# Patient Record
Sex: FEMALE | Race: WHITE | NOT HISPANIC OR LATINO | Employment: FULL TIME | URBAN - METROPOLITAN AREA
[De-identification: names, ages, dates, MRNs, and addresses within clinical notes are randomized per-mention and may not be internally consistent; named-entity substitution may affect disease eponyms.]

---

## 2017-01-03 ENCOUNTER — TRANSCRIBE ORDERS (OUTPATIENT)
Dept: ADMINISTRATIVE | Facility: HOSPITAL | Age: 42
End: 2017-01-03

## 2017-01-03 DIAGNOSIS — M54.6 THORACIC BACK PAIN, UNSPECIFIED BACK PAIN LATERALITY, UNSPECIFIED CHRONICITY: Primary | ICD-10-CM

## 2017-01-06 DIAGNOSIS — M54.2 NECK PAIN: ICD-10-CM

## 2017-01-09 RX ORDER — METAXALONE 800 MG/1
TABLET ORAL
Qty: 60 TABLET | Refills: 0 | Status: SHIPPED | OUTPATIENT
Start: 2017-01-09 | End: 2017-02-05

## 2017-01-12 ENCOUNTER — TREATMENT (OUTPATIENT)
Dept: PHYSICAL THERAPY | Facility: CLINIC | Age: 42
End: 2017-01-12

## 2017-01-12 DIAGNOSIS — S39.012D LUMBAR STRAIN, SUBSEQUENT ENCOUNTER: ICD-10-CM

## 2017-01-12 DIAGNOSIS — M54.2 PAIN, NECK: Primary | ICD-10-CM

## 2017-01-12 PROCEDURE — 97530 THERAPEUTIC ACTIVITIES: CPT | Performed by: PHYSICAL THERAPIST

## 2017-01-12 PROCEDURE — 97110 THERAPEUTIC EXERCISES: CPT | Performed by: PHYSICAL THERAPIST

## 2017-01-12 PROCEDURE — 97140 MANUAL THERAPY 1/> REGIONS: CPT | Performed by: PHYSICAL THERAPIST

## 2017-01-12 PROCEDURE — 97164 PT RE-EVAL EST PLAN CARE: CPT | Performed by: PHYSICAL THERAPIST

## 2017-01-12 NOTE — PROGRESS NOTES
RE-Evaluation     Subjective    Patient reports having a MRI at [facility] showing bulging disc and arthritis;  [facility] Pain MD - Has received 2 injections (1st one increased pain, but second one went better with mm relaxer and pain meds yesterday);  Pt reports decreased pain and improved mobility overall, but still limited and painful - Prior to injections, pain was 10/10 with severely limited mobility and function - Now pain level 6-7/10 and limited cervical rotation to (L) >(R) - Denies UE NT-ing since the first injection;   Pain (B) neck and UT (R-side mm are usually tighter) -   LBP central mostly -          Objective     Guarded posture - Pt making repeated neck movements side/side - Loss of cervical lordosis -     PALPATION: Tender (B) CPS, Facets and UT w/ multiple trigger points; 1st ribs (B); Scalenes (B); SCM (B)    AROM: C-Flexion 25-deg; Ext 35-deg; SB 40-deg; ROT 30-deg (R); 40-deg (L);  Lumbar spine; Flex WFL, but pain central lumbar spine; Ext 50% w/ pain; Pain / /limited SB grossly (B);     STRENGTH: UE/LE myotomes grossly WFL    SPECIAL TESTS: Cervical Compression (decreased pain); Cervical Distraction (decreased pain); (+) Elevated 1st rib (B); (+) SI Jt dysfunction; Flexed sacrum;     PROCEDURES AND MODALITIES:     FUNCTIONAL ACTIVITIES:  X 20min  ·   TAPING / BRACING:  · Reviewed HEP and gym activities and progression  · Fitted patient with TENS unit   ·    Jt protection, ADL modification; Posture and      EXERCISE  Exercise 1  Exercise Name 1: UT stretching (B)  Sets/Reps 1: 5/30 sec  Treatment Type 1: Therapeutic Exercise  Exercise 2  Exercise Name 2: Chin Tucks  Sets/Reps 2: 15 / 5 sec ea  Treatment Type 2: Therapeutic Exercise Exercise 3  Exercise Name 3: Scap Retraction  Sets/Reps 3: 20/10 sec ea  Treatment Type 3: Therapeutic Exercise                                                MANUAL PT:  Manual Rx 1 Location: STM / DTM / TPR  Manual Rx 1 Type: (B) UT/ CPS,  Med scap   Manual Rx 1 Duration: 15 min    Manual Rx 2 Location: 1st Rib mobilizaiton (R) / (L)  Manual Rx 2 Duration: 10 min     Manual PT [id number] 25 minutes, Therapy Exercise [id number] 15 minutes and Ther Act 56504 20 minutes; RE-EVAL x 20 minutes    Assessment/Plan     Cervical strain; Elevated first rib; LBP; SI Jt dysfunction - Since over-doing it at gym this fall -   Goals: Continue to progress towards goals set at initial evaluation - -     Continue PT 2-3x/wk x 4-6 weeks with modalities prn, manual therapy, taping prn, therapeutic exercise / therapeutic activities, NMR, jt mobilization, Pt ed, HEP, etc    Hiram Mercedes, PT  Physical Therapist  ________________________________________________________    I certify that the therapy services are furnished while this patient is under my care. The services outlined above are required by this patient, and will be reviewed every 90 days.      PHYSICIAN SIGNATURE: __________________________ DATE: ______   Jack Malone Jr., DO         Please sign and return via fax to [phone]. Thank you, [facility].

## 2017-01-12 NOTE — LETTER
[facility]  Physical Therapy  _____________________________________________________  RE-Evaluation   To: Jack Malone Jr., DO  From: Hiram Mercedes PT  Re: Nathalie Weber  Date: 01/12/2017    Subjective    Patient reports having a MRI at [facility] showing bulging disc and arthritis;  [facility] Pain MD - Has received 2 injections (1st one increased pain, but second one went better with mm relaxer and pain meds yesterday);  Pt reports decreased pain and improved mobility overall, but still limited and painful - Prior to injections, pain was 10/10 with severely limited mobility and function - Now pain level 6-7/10 and limited cervical rotation to (L) >(R) - Denies UE NT-ing since the first injection;   Pain (B) neck and UT (R-side mm are usually tighter) -   LBP central mostly -          Objective     Guarded posture - Pt making repeated neck movements side/side - Loss of cervical lordosis -   PALPATION: Tender (B) CPS, Facets and UT w/ multiple trigger points; 1st ribs (B); Scalenes (B); SCM (B)  AROM: C-Flexion 25-deg; Ext 35-deg; SB 40-deg; ROT 30-deg (R); 40-deg (L);  Lumbar spine; Flex WFL, but pain central lumbar spine; Ext 50% w/ pain; Pain / /limited SB grossly (B);   STRENGTH: UE/LE myotomes grossly WFL  SPECIAL TESTS: Cervical Compression (decreased pain); Cervical Distraction (decreased pain); (+) Elevated 1st rib (B); (+) SI Jt dysfunction; Flexed sacrum;     Assessment/Plan     Cervical strain; Elevated first rib; LBP; SI Jt dysfunction - Since over-doing it at gym this fall -   Goals: Continue to progress towards goals set at initial evaluation - -     Resume / Continue PT 2-3x/wk x 4-6 weeks with modalities prn, manual therapy, taping prn, therapeutic exercise / therapeutic activities, NMR, jt mobilization, Pt ed, HEP, etc    Hiram Mercedes, PT  Physical Therapist  ________________________________________________________    I certify that the therapy services are furnished while  this patient is under my care. The services outlined above are required by this patient, and will be reviewed every 90 days.      PHYSICIAN SIGNATURE: __________________________ DATE: ______   Jack Malone Jr., DO         Please sign and return via fax to [phone]. Thank you, [facility].    _____________________________________________________________________  [address] [place] [address]  Phone: [phone]     Fax: [phone]

## 2017-01-18 ENCOUNTER — TREATMENT (OUTPATIENT)
Dept: PHYSICAL THERAPY | Facility: CLINIC | Age: 42
End: 2017-01-18

## 2017-01-18 DIAGNOSIS — S39.012D LUMBAR STRAIN, SUBSEQUENT ENCOUNTER: ICD-10-CM

## 2017-01-18 DIAGNOSIS — M54.2 PAIN, NECK: Primary | ICD-10-CM

## 2017-01-18 PROCEDURE — 97035 APP MDLTY 1+ULTRASOUND EA 15: CPT | Performed by: PHYSICAL THERAPIST

## 2017-01-18 PROCEDURE — 97530 THERAPEUTIC ACTIVITIES: CPT | Performed by: PHYSICAL THERAPIST

## 2017-01-18 PROCEDURE — 97140 MANUAL THERAPY 1/> REGIONS: CPT | Performed by: PHYSICAL THERAPIST

## 2017-01-23 NOTE — PROGRESS NOTES
Daily Progress Note  Subjective    Patient reports significantly increased pain in (B) UT/Neck after last session and since - Pt reports only taking OTC anti-inflammatories for the pain which are not helpful -          Objective     Pt in obvious discomfort with guarded posture and cervical AROM -     PALPATION: Severely tender and tight (B) UT/ CPS with multiple trigger points and hypersensitivity -     AROM: C-AROM WFL    STRENGTH:     SPECIAL TESTS: Elevated 1st rib (B)    PROCEDURES AND MODALITIES:     Ultrasound 00679  Location: (B) UT / lower c-spine  Rx Minutes: 6 min each - 12 min tl  Duty Cycle: 100  Frequency: 1.0 MHz  Intensity - Wts/cm: 2       FUNCTIONAL ACTIVITIES:  X 15 min  ·   TAPING / BRACING: K-Tape to Unload (B) UT ; (B) CPS;  - TA  ·    Jt protection, ADL modification; Posture and      EXERCISE  Exercise 1  Exercise Name 1: UT stretching (B)  Sets/Reps 1: 5/30 sec  Treatment Type 1: Therapeutic Exercise                                                        MANUAL PT:  Manual Rx 1 Location: STM / MFR   Manual Rx 1 Type: (B) UT, CPS  Manual Rx 1 Duration: 20    Manual Rx 6 Location: CERVICAL Distraction / mobilization  Manual Rx 6 Duration: 10 min           Manual PT [id number] 30 minutes, Therapy Exercise 95889 05 minutes, Ther Act 83649 15 minutes and Other Procedure CPT Ultrasound minutes 12    Timed Code Treatment: 62 Minutes and Total Treatment Time: 70 Minutes    Assessment/Plan     Cervical strain; Elevated first rib; myofascial syndrome;LBP; SI Jt dysfunction - Since over-doing it at gym this fall -   Severely increased pain after last session - Unusual as this same treatment had been effective in the past -   Pt may benefit from further assessment due to persistent and severe pain -  Pt may not benefit from further Physical Therapy due to severe reaction -     Progress strengthening /stabilization /functional activity vs hold PT until further assessment -          Hiram Mercedes,  PT  Physical Therapist

## 2017-01-27 ENCOUNTER — HOSPITAL ENCOUNTER (OUTPATIENT)
Dept: MRI IMAGING | Facility: HOSPITAL | Age: 42
Discharge: HOME | End: 2017-01-27
Admitting: PAIN MEDICINE

## 2017-01-27 DIAGNOSIS — M54.6 THORACIC BACK PAIN, UNSPECIFIED BACK PAIN LATERALITY, UNSPECIFIED CHRONICITY: ICD-10-CM

## 2017-01-27 PROCEDURE — 72146 MRI CHEST SPINE W/O DYE: CPT

## 2017-02-02 ENCOUNTER — TRANSCRIBE ORDERS (OUTPATIENT)
Dept: ADMINISTRATIVE | Facility: HOSPITAL | Age: 42
End: 2017-02-02

## 2017-02-02 DIAGNOSIS — R93.7 ABNORMAL MRI, THORACIC SPINE: Primary | ICD-10-CM

## 2017-02-05 RX ORDER — BACLOFEN 10 MG/1
10 TABLET ORAL 2 TIMES DAILY
COMMUNITY
End: 2017-06-27

## 2017-02-05 RX ORDER — HYDROCODONE BITARTRATE AND ACETAMINOPHEN 5; 325 MG/1; MG/1
1 TABLET ORAL EVERY 8 HOURS PRN
COMMUNITY
End: 2017-06-27

## 2017-02-09 ENCOUNTER — HOSPITAL ENCOUNTER (OUTPATIENT)
Dept: GENERAL RADIOLOGY | Facility: HOSPITAL | Age: 42
Discharge: HOME | End: 2017-02-09

## 2017-02-09 ENCOUNTER — HOSPITAL ENCOUNTER (OUTPATIENT)
Dept: CT IMAGING | Facility: HOSPITAL | Age: 42
Discharge: HOME | End: 2017-02-09

## 2017-02-09 ENCOUNTER — TRANSCRIBE ORDERS (OUTPATIENT)
Dept: ADMINISTRATIVE | Facility: HOSPITAL | Age: 42
End: 2017-02-09

## 2017-02-09 VITALS
HEIGHT: 62 IN | WEIGHT: 102 LBS | SYSTOLIC BLOOD PRESSURE: 145 MMHG | BODY MASS INDEX: 18.77 KG/M2 | DIASTOLIC BLOOD PRESSURE: 99 MMHG | RESPIRATION RATE: 16 BRPM | TEMPERATURE: 97.3 F | HEART RATE: 50 BPM | OXYGEN SATURATION: 100 %

## 2017-02-09 DIAGNOSIS — R93.7 ABNORMAL MRI, THORACIC SPINE: ICD-10-CM

## 2017-02-09 DIAGNOSIS — R93.89 ABNORMAL MRI: Primary | ICD-10-CM

## 2017-02-09 PROCEDURE — 72255 MYELOGRAPHY THORACIC SPINE: CPT

## 2017-02-09 PROCEDURE — 72128 CT CHEST SPINE W/O DYE: CPT

## 2017-02-09 PROCEDURE — 25010000002 CHLOROPROCAINE PER 30 ML: Performed by: RADIOLOGY

## 2017-02-09 PROCEDURE — 0 IOPAMIDOL 61 % SOLUTION: Performed by: RADIOLOGY

## 2017-02-09 RX ORDER — IBUPROFEN 800 MG/1
800 TABLET ORAL AS NEEDED
COMMUNITY
Start: 2016-10-21 | End: 2017-02-12

## 2017-02-09 RX ORDER — ALPRAZOLAM 1 MG/1
1 TABLET ORAL ONCE
Status: COMPLETED | OUTPATIENT
Start: 2017-02-09 | End: 2017-02-09

## 2017-02-09 RX ADMIN — CHLOROPROCAINE HYDROCHLORIDE 4 ML: 20 INJECTION, SOLUTION INFILTRATION; PERINEURAL at 09:13

## 2017-02-09 RX ADMIN — ALPRAZOLAM 1 MG: 1 TABLET ORAL at 08:16

## 2017-02-09 RX ADMIN — ALPRAZOLAM 1 MG: 1 TABLET ORAL at 08:35

## 2017-02-09 RX ADMIN — IOPAMIDOL 11 ML: 612 INJECTION, SOLUTION INTRATHECAL at 09:14

## 2017-02-09 NOTE — DISCHARGE INSTRUCTIONS
Verbal and written D/C instructions given to patient and family member, father.  She voices understanding and is able to teach back D/C instructions.  Scheduled procedure for 2/17/17 Friday was given to patient in writing and she was instructed to bring the provided order with her on that date. She voiced understanding.

## 2017-02-09 NOTE — NURSING NOTE
Patient states she is not feeling any thing from the Xanax 1 mg given earlier.  Dr. Tenorio ordered an additional Xanax 1 mg.  Patient awake and alert, talking

## 2017-02-09 NOTE — NURSING NOTE
Patient called to come back in for a delayed CT scan to see if images needed can be seen.  Dr. Newton will talk with patient after.  Patient is on her way now.

## 2017-02-10 ENCOUNTER — HOSPITAL ENCOUNTER (EMERGENCY)
Facility: HOSPITAL | Age: 42
Discharge: HOME | End: 2017-02-11
Attending: EMERGENCY MEDICINE | Admitting: EMERGENCY MEDICINE

## 2017-02-10 DIAGNOSIS — G97.1 POST-LUMBAR PUNCTURE HEADACHE: Primary | ICD-10-CM

## 2017-02-10 LAB
ANION GAP SERPL CALCULATED.3IONS-SCNC: 13.6 MMOL/L
APTT PPP: 31.7 SECONDS (ref 22.7–35.4)
BASOPHILS # BLD AUTO: 0.12 10*3/MM3 (ref 0–0.2)
BASOPHILS NFR BLD AUTO: 1.2 % (ref 0–1.5)
BUN BLD-MCNC: 10 MG/DL (ref 6–20)
BUN/CREAT SERPL: 11.2 (ref 7–25)
CALCIUM SPEC-SCNC: 9.3 MG/DL (ref 8.6–10.5)
CHLORIDE SERPL-SCNC: 109 MMOL/L (ref 98–107)
CO2 SERPL-SCNC: 21.4 MMOL/L (ref 22–29)
CREAT BLD-MCNC: 0.89 MG/DL (ref 0.57–1)
DEPRECATED RDW RBC AUTO: 50.6 FL (ref 37–54)
EOSINOPHIL # BLD AUTO: 1.21 10*3/MM3 (ref 0–0.7)
EOSINOPHIL NFR BLD AUTO: 12 % (ref 0.3–6.2)
ERYTHROCYTE [DISTWIDTH] IN BLOOD BY AUTOMATED COUNT: 12.9 % (ref 11.7–13)
GFR SERPL CREATININE-BSD FRML MDRD: 70 ML/MIN/1.73
GLUCOSE BLD-MCNC: 100 MG/DL (ref 65–99)
HCT VFR BLD AUTO: 40.2 % (ref 35.6–45.5)
HGB BLD-MCNC: 13.9 G/DL (ref 11.9–15.5)
IMM GRANULOCYTES # BLD: 0.02 10*3/MM3 (ref 0–0.03)
IMM GRANULOCYTES NFR BLD: 0.2 % (ref 0–0.5)
INR PPP: 1.06 (ref 0.9–1.1)
LYMPHOCYTES # BLD AUTO: 3.69 10*3/MM3 (ref 0.9–4.8)
LYMPHOCYTES NFR BLD AUTO: 36.6 % (ref 19.6–45.3)
MACROCYTES BLD QL SMEAR: NORMAL
MCH RBC QN AUTO: 37.4 PG (ref 26.9–32)
MCHC RBC AUTO-ENTMCNC: 34.6 G/DL (ref 32.4–36.3)
MCV RBC AUTO: 108.1 FL (ref 80.5–98.2)
MONOCYTES # BLD AUTO: 0.74 10*3/MM3 (ref 0.2–1.2)
MONOCYTES NFR BLD AUTO: 7.3 % (ref 5–12)
NEUTROPHILS # BLD AUTO: 4.29 10*3/MM3 (ref 1.9–8.1)
NEUTROPHILS NFR BLD AUTO: 42.7 % (ref 42.7–76)
PLAT MORPH BLD: NORMAL
PLATELET # BLD AUTO: 242 10*3/MM3 (ref 140–500)
PMV BLD AUTO: 11.1 FL (ref 6–12)
POTASSIUM BLD-SCNC: 3.9 MMOL/L (ref 3.5–5.2)
PROTHROMBIN TIME: 13.4 SECONDS (ref 11.7–14.2)
RBC # BLD AUTO: 3.72 10*6/MM3 (ref 3.9–5.2)
SODIUM BLD-SCNC: 144 MMOL/L (ref 136–145)
WBC MORPH BLD: NORMAL
WBC NRBC COR # BLD: 10.07 10*3/MM3 (ref 4.5–10.7)

## 2017-02-10 PROCEDURE — 85730 THROMBOPLASTIN TIME PARTIAL: CPT | Performed by: PHYSICIAN ASSISTANT

## 2017-02-10 PROCEDURE — 85007 BL SMEAR W/DIFF WBC COUNT: CPT | Performed by: PHYSICIAN ASSISTANT

## 2017-02-10 PROCEDURE — 99283 EMERGENCY DEPT VISIT LOW MDM: CPT

## 2017-02-10 PROCEDURE — 96375 TX/PRO/DX INJ NEW DRUG ADDON: CPT

## 2017-02-10 PROCEDURE — 85610 PROTHROMBIN TIME: CPT | Performed by: PHYSICIAN ASSISTANT

## 2017-02-10 PROCEDURE — 25010000002 HYDROMORPHONE PER 4 MG: Performed by: EMERGENCY MEDICINE

## 2017-02-10 PROCEDURE — 96376 TX/PRO/DX INJ SAME DRUG ADON: CPT

## 2017-02-10 PROCEDURE — 25010000002 ONDANSETRON PER 1 MG: Performed by: PHYSICIAN ASSISTANT

## 2017-02-10 PROCEDURE — 96361 HYDRATE IV INFUSION ADD-ON: CPT

## 2017-02-10 PROCEDURE — 96374 THER/PROPH/DIAG INJ IV PUSH: CPT

## 2017-02-10 PROCEDURE — 85025 COMPLETE CBC W/AUTO DIFF WBC: CPT | Performed by: PHYSICIAN ASSISTANT

## 2017-02-10 PROCEDURE — 25010000002 HYDROMORPHONE PER 4 MG: Performed by: PHYSICIAN ASSISTANT

## 2017-02-10 PROCEDURE — 80048 BASIC METABOLIC PNL TOTAL CA: CPT | Performed by: PHYSICIAN ASSISTANT

## 2017-02-10 RX ORDER — ONDANSETRON 2 MG/ML
4 INJECTION INTRAMUSCULAR; INTRAVENOUS ONCE
Status: COMPLETED | OUTPATIENT
Start: 2017-02-10 | End: 2017-02-10

## 2017-02-10 RX ORDER — SODIUM CHLORIDE 0.9 % (FLUSH) 0.9 %
10 SYRINGE (ML) INJECTION AS NEEDED
Status: DISCONTINUED | OUTPATIENT
Start: 2017-02-10 | End: 2017-02-11 | Stop reason: HOSPADM

## 2017-02-10 RX ADMIN — ONDANSETRON 4 MG: 2 INJECTION INTRAMUSCULAR; INTRAVENOUS at 22:43

## 2017-02-10 RX ADMIN — HYDROMORPHONE HYDROCHLORIDE 1 MG: 1 INJECTION, SOLUTION INTRAMUSCULAR; INTRAVENOUS; SUBCUTANEOUS at 23:59

## 2017-02-10 RX ADMIN — SODIUM CHLORIDE 1000 ML: 9 INJECTION, SOLUTION INTRAVENOUS at 22:39

## 2017-02-10 RX ADMIN — HYDROMORPHONE HYDROCHLORIDE 1 MG: 1 INJECTION, SOLUTION INTRAMUSCULAR; INTRAVENOUS; SUBCUTANEOUS at 22:44

## 2017-02-10 NOTE — ED NOTES
Pt had a mylogram yesterday. They called radiology triage and radiology triage told her that the ER staff needed to get her out of her car by stretcher. Pt's family states that the radiology triage nurse  told them that we could not put her into a wheelchair, that we had to place her into a stretcher from the car. Pt states that the radiology triage nurse told them to come into the ER to get a blood patch.     Loyd Hawkins  02/10/17 1854       Loyd Hawkins  02/10/17 1857       Loyd Hawkins  02/10/17 1901

## 2017-02-11 ENCOUNTER — HOSPITAL ENCOUNTER (EMERGENCY)
Dept: PREOP | Facility: HOSPITAL | Age: 42
Discharge: HOME | End: 2017-02-11

## 2017-02-11 ENCOUNTER — ANESTHESIA (OUTPATIENT)
Dept: PREOP | Facility: HOSPITAL | Age: 42
End: 2017-02-11

## 2017-02-11 ENCOUNTER — ANESTHESIA EVENT (OUTPATIENT)
Dept: PREOP | Facility: HOSPITAL | Age: 42
End: 2017-02-11

## 2017-02-11 ENCOUNTER — APPOINTMENT (OUTPATIENT)
Dept: PREOP | Facility: HOSPITAL | Age: 42
End: 2017-02-11

## 2017-02-11 VITALS
HEIGHT: 62 IN | TEMPERATURE: 97.7 F | OXYGEN SATURATION: 98 % | BODY MASS INDEX: 19.14 KG/M2 | WEIGHT: 104 LBS | RESPIRATION RATE: 16 BRPM | HEART RATE: 69 BPM | SYSTOLIC BLOOD PRESSURE: 127 MMHG | DIASTOLIC BLOOD PRESSURE: 100 MMHG

## 2017-02-11 VITALS
DIASTOLIC BLOOD PRESSURE: 59 MMHG | HEART RATE: 68 BPM | OXYGEN SATURATION: 97 % | TEMPERATURE: 97.8 F | RESPIRATION RATE: 12 BRPM | SYSTOLIC BLOOD PRESSURE: 91 MMHG

## 2017-02-11 LAB
HOLD SPECIMEN: NORMAL
HOLD SPECIMEN: NORMAL
WHOLE BLOOD HOLD SPECIMEN: NORMAL
WHOLE BLOOD HOLD SPECIMEN: NORMAL

## 2017-02-11 PROCEDURE — 25010000002 MIDAZOLAM PER 1 MG: Performed by: ANESTHESIOLOGY

## 2017-02-11 PROCEDURE — 25010000002 FENTANYL CITRATE (PF) 100 MCG/2ML SOLUTION: Performed by: ANESTHESIOLOGY

## 2017-02-11 RX ORDER — HYDROCODONE BITARTRATE AND ACETAMINOPHEN 10; 325 MG/1; MG/1
1 TABLET ORAL EVERY 6 HOURS PRN
Qty: 5 TABLET | Refills: 0 | Status: SHIPPED | OUTPATIENT
Start: 2017-02-11 | End: 2017-06-27

## 2017-02-11 RX ORDER — SODIUM CHLORIDE, SODIUM LACTATE, POTASSIUM CHLORIDE, CALCIUM CHLORIDE 600; 310; 30; 20 MG/100ML; MG/100ML; MG/100ML; MG/100ML
9 INJECTION, SOLUTION INTRAVENOUS CONTINUOUS
Status: DISCONTINUED | OUTPATIENT
Start: 2017-02-11 | End: 2017-02-12 | Stop reason: HOSPADM

## 2017-02-11 RX ORDER — FENTANYL CITRATE 50 UG/ML
50 INJECTION, SOLUTION INTRAMUSCULAR; INTRAVENOUS
Status: DISCONTINUED | OUTPATIENT
Start: 2017-02-11 | End: 2017-02-12 | Stop reason: HOSPADM

## 2017-02-11 RX ORDER — SODIUM CHLORIDE 0.9 % (FLUSH) 0.9 %
1-10 SYRINGE (ML) INJECTION AS NEEDED
Status: DISCONTINUED | OUTPATIENT
Start: 2017-02-11 | End: 2017-02-12 | Stop reason: HOSPADM

## 2017-02-11 RX ORDER — MIDAZOLAM HYDROCHLORIDE 1 MG/ML
1 INJECTION INTRAMUSCULAR; INTRAVENOUS
Status: DISCONTINUED | OUTPATIENT
Start: 2017-02-11 | End: 2017-02-12 | Stop reason: HOSPADM

## 2017-02-11 RX ORDER — MIDAZOLAM HYDROCHLORIDE 1 MG/ML
2 INJECTION INTRAMUSCULAR; INTRAVENOUS
Status: DISCONTINUED | OUTPATIENT
Start: 2017-02-11 | End: 2017-02-12 | Stop reason: HOSPADM

## 2017-02-11 RX ADMIN — MIDAZOLAM HYDROCHLORIDE 1 MG: 1 INJECTION, SOLUTION INTRAMUSCULAR; INTRAVENOUS at 09:38

## 2017-02-11 RX ADMIN — FENTANYL CITRATE 50 MCG: 50 INJECTION INTRAMUSCULAR; INTRAVENOUS at 09:38

## 2017-02-11 RX ADMIN — SODIUM CHLORIDE, POTASSIUM CHLORIDE, SODIUM LACTATE AND CALCIUM CHLORIDE 9 ML/HR: 600; 310; 30; 20 INJECTION, SOLUTION INTRAVENOUS at 09:38

## 2017-02-11 NOTE — NURSING NOTE
VSS PT IN HOLDING FOR BLOOD PATCH.  STATES H/A PAIN IS A 10  MEDS GIVEN PER NURSE.  EPIDURAL PER ANESTHESIA.  PT TOLERATED PROCEDURE WELL.

## 2017-02-11 NOTE — ED NOTES
Pt in room with , crying stating her head is no better.  Charli into see      Cristhian Christopher RN  02/10/17 4281

## 2017-02-11 NOTE — ED NOTES
Pt calm at discharge.  Voiced understanding regarding coming back in the am for blood patch.       Cristhian Christopher RN  02/11/17 0102

## 2017-02-11 NOTE — ED PROVIDER NOTES
Pt presents with post LP headache after myelogram yesterday. HA is positional and she seems very uncomfortable. Spoke with anesthesia twice tonight and they say they are very short staffed and unable to perform blood patch tonight. Arranged for her to receive blood patch tomorrow at 0830. Offered admission which she declines.             I supervised care provided by the midlevel provider.   We have discussed this patient's history, physical exam, and treatment plan.  I have reviewed the note and personally saw and examined the patient and agree with the plan of care. See attending note.  Documentation assistance provided by mainor Mcclain for Dr. Olson.  Information recorded by the scribe was done at my direction and has been verified and validated by me.       Do Mcclain  02/11/17 0036       Chepe Olson MD  02/11/17 0154

## 2017-02-11 NOTE — ED PROVIDER NOTES
" EMERGENCY DEPARTMENT ENCOUNTER    CHIEF COMPLAINT  Chief Complaint: HA secondary to a myelogram  History given by: Patient  History limited by: Nothing  Room Number: 02/02  PMD: Henry Witt MD      HPI:  Pt is a 41 y.o. female who presents complaining of HA secondary to a myelogram yesterday. The pt was getting a myelogram for further evaluation into a cyst on her spine. Pt reports nausea, chills, and vomiting. Pt denies fever, numbness, and tingling.    Duration: Since the myelogram yesterday  Onset: Gradual  Timing: Constant  Location: Head  Radiation: None  Quality: \"Pain\"  Intensity/Severity: Moderate  Progression: Unchanged  Associated Symptoms: Nausea, chills, and vomiting  Aggravating Factors: Position, upright  Alleviating Factors: Nothing  Previous Episodes: None  Treatment before arrival: Nothing    PAST MEDICAL HISTORY  Active Ambulatory Problems     Diagnosis Date Noted   • Macrocytosis without anemia 05/16/2016     Resolved Ambulatory Problems     Diagnosis Date Noted   • No Resolved Ambulatory Problems     Past Medical History   Diagnosis Date   • Anxiety    • Depression    • Hypertension        PAST SURGICAL HISTORY  Past Surgical History   Procedure Laterality Date   • Cervical conization, leep     • Cervical conization, leep     • Epidural block injection       cervical and lumbar       FAMILY HISTORY  Family History   Problem Relation Age of Onset   • Cancer Mother    • Hypertension Father        SOCIAL HISTORY  Social History     Social History   • Marital status: Unknown     Spouse name: N/A   • Number of children: N/A   • Years of education: N/A     Occupational History   • Not on file.     Social History Main Topics   • Smoking status: Former Smoker     Types: Cigarettes     Quit date: 3/1/2016   • Smokeless tobacco: Not on file   • Alcohol use No   • Drug use: Not on file   • Sexual activity: Not on file     Other Topics Concern   • Not on file     Social History Narrative   • No " narrative on file       ALLERGIES  Lidocaine; Novocain [procaine]; and Percocet [oxycodone-acetaminophen]    REVIEW OF SYSTEMS  Review of Systems   Constitutional: Positive for chills. Negative for fever.   HENT: Negative for sore throat and trouble swallowing.    Eyes: Negative for visual disturbance.   Respiratory: Negative for cough and shortness of breath.    Cardiovascular: Negative for chest pain, palpitations and leg swelling.   Gastrointestinal: Positive for nausea and vomiting. Negative for abdominal pain and diarrhea.   Endocrine: Negative.    Genitourinary: Negative for decreased urine volume, dysuria and frequency.   Musculoskeletal: Negative for neck pain.   Skin: Negative for rash.   Allergic/Immunologic: Negative.    Neurological: Positive for headaches. Negative for syncope, weakness and numbness.   Hematological: Negative.    Psychiatric/Behavioral: Negative.    All other systems reviewed and are negative.      PHYSICAL EXAM  ED Triage Vitals   Temp Heart Rate Resp BP SpO2   02/10/17 1852 02/10/17 1852 02/10/17 1852 02/10/17 1852 02/10/17 1852   96.5 °F (35.8 °C) 65 20 113/82 100 %      Temp src Heart Rate Source Patient Position BP Location FiO2 (%)   02/10/17 1852 02/10/17 1852 02/10/17 1852 02/10/17 1852 --   Tympanic Monitor Lying Right arm        Physical Exam   Constitutional: She is oriented to person, place, and time and well-developed, well-nourished, and in no distress. No distress.   HENT:   Head: Normocephalic and atraumatic.   Eyes: EOM are normal. Pupils are equal, round, and reactive to light.   Neck: Normal range of motion. Neck supple.   The pt has no meningeal signs.   Cardiovascular: Normal rate, regular rhythm and normal heart sounds.    Pulmonary/Chest: Effort normal and breath sounds normal. No respiratory distress.   Abdominal: Soft. There is no tenderness. There is no rebound and no guarding.   Musculoskeletal: Normal range of motion. She exhibits no edema.   Neurological:  She is alert and oriented to person, place, and time. She has normal sensation and normal strength.   The pt has no focal neurological deficits.   Skin: Skin is warm and dry. No rash noted.   Psychiatric: Mood and affect normal.   Nursing note and vitals reviewed.      LAB RESULTS  Lab Results (last 24 hours)     Procedure Component Value Units Date/Time    CBC & Differential [[id number]] Collected:  02/10/17 2240    Specimen:  Blood Updated:  02/10/17 2318    Narrative:       The following orders were created for panel order CBC & Differential.  Procedure                               Abnormality         Status                     ---------                               -----------         ------                     Scan Slide[[id number]]                                        Final result               CBC Auto Differential[[id number]]         Abnormal            Final result                 Please view results for these tests on the individual orders.    Basic Metabolic Panel [[id number]]  (Abnormal) Collected:  02/10/17 2240    Specimen:  Blood from Arm, Left Updated:  02/10/17 2317     Glucose 100 (H) mg/dL      BUN 10 mg/dL      Creatinine 0.89 mg/dL      Sodium 144 mmol/L      Potassium 3.9 mmol/L      Chloride 109 (H) mmol/L      CO2 21.4 (L) mmol/L      Calcium 9.3 mg/dL      eGFR Non African Amer 70 mL/min/1.73      BUN/Creatinine Ratio 11.2      Anion Gap 13.6 mmol/L     Narrative:       GFR Normal >60  Chronic Kidney Disease <60  Kidney Failure <15    Protime-INR [[id number]]  (Normal) Collected:  02/10/17 2240    Specimen:  Blood from Arm, Left Updated:  02/10/17 2311     Protime 13.4 Seconds      INR 1.06     aPTT [[id number]]  (Normal) Collected:  02/10/17 2240    Specimen:  Blood from Arm, Left Updated:  02/10/17 2311     PTT 31.7 seconds     CBC Auto Differential [[id number]]  (Abnormal) Collected:  02/10/17 2240    Specimen:  Blood from Arm, Left Updated:  02/10/17 2318     WBC 10.07 10*3/mm3      RBC 3.72 (L)  10*6/mm3      Hemoglobin 13.9 g/dL      Hematocrit 40.2 %      .1 (H) fL      MCH 37.4 (H) pg      MCHC 34.6 g/dL      RDW 12.9 %      RDW-SD 50.6 fl      MPV 11.1 fL      Platelets 242 10*3/mm3      Neutrophil % 42.7 %      Lymphocyte % 36.6 %      Monocyte % 7.3 %      Eosinophil % 12.0 (H) %      Basophil % 1.2 %      Immature Grans % 0.2 %      Neutrophils, Absolute 4.29 10*3/mm3      Lymphocytes, Absolute 3.69 10*3/mm3      Monocytes, Absolute 0.74 10*3/mm3      Eosinophils, Absolute 1.21 (H) 10*3/mm3      Basophils, Absolute 0.12 10*3/mm3      Immature Grans, Absolute 0.02 10*3/mm3     Scan Slide [[id number]] Collected:  02/10/17 2240    Specimen:  Blood from Arm, Left Updated:  02/10/17 2318     Macrocytes Large/3+      WBC Morphology Normal      Platelet Morphology Normal           I ordered the above labs and reviewed the results    RADIOLOGY  No orders to display        I ordered the above noted radiological studies. Interpreted by radiologist. Reviewed by me in PACS.       PROCEDURES  Procedures      PROGRESS AND CONSULTS  ED Course     2239  IVFs, Dilaudid, and Zofran ordered.    2247  Labs ordered for further evaluation.    2250  Received a call from Anesthesiology and discussed pt's case. Anesthesiology states they will be unable to perform the blood patch at this time.    2343  BP- 127/100 HR- 69 Temp- 97.7 °F (36.5 °C) O2 sat- 98%    Rechecked pt, she is currently in pain even after receiving the dose of Dilaudid. The pt states the Dilaudid helped initially, but it is not helping the pain at this time. Discussed the plan to order another dose of Dilaudid to help with her pain. The pt understands and agrees with the plan.    2353  Dilaudid ordered.    0011  BP- 127/100 HR- 69 Temp- 97.7 °F (36.5 °C) O2 sat- 98%    Rechecked pt, her pain has improved after receiving the Dilaudid, but she is still having pain. I informed the pt that we called anesthesiology, but they are not able to perform the  blood patch. Discussed the plan to admit the pt, but the pt states she would rather be discharged home because she would feel more comfortable there. The pt states she would f/u tomorrow to get the procedure done. Discussed the plan to consult with anesthesiology to try and schedule a blood patch procedure. The pt understands and agrees with the plan.    0030  After talking with anesthesiology, the pt has a f/u appointment tomorrow at 0830 for a blood patch. Ginger discussed the appointment with the pt.    MEDICAL DECISION MAKING  Results were reviewed/discussed with the patient and they were also made aware of online access. Pt also made aware that some labs, such as cultures, will not be resulted during ER visit and follow up with PMD is necessary.     MDM  Number of Diagnoses or Management Options     Amount and/or Complexity of Data Reviewed  Clinical lab tests: ordered and reviewed  Discussion of test results with the performing providers: yes (Anesthesiology)  Review and summarize past medical records: yes (The pt had an LP done yesterday.)  Discuss the patient with other providers: yes    Patient Progress  Patient progress: stable         DIAGNOSIS  Final diagnoses:   Post-lumbar puncture headache     DISPOSITION    DISCHARGE    Patient discharged in stable condition.    Reviewed implications of results, diagnosis, meds, responsibility to follow up, warning signs and symptoms of possible worsening, potential complications and reasons to return to ER.    Patient/Family voiced understanding of above instructions.    Discussed plan for discharge, as there is no emergent indication for admission.  Pt/family is agreeable and understands need for follow up and repeat testing.  Pt is aware that discharge does not mean that nothing is wrong but it indicates no emergency is present that requires admission and they must continue care with follow-up as given below or physician of their choice.     FOLLOW-UP  Henry RAMIREZ  MD Miryam  [address]  [place] [address]  [facility] [address]  [phone]    Schedule an appointment as soon as possible for a visit in 1 week           Medication List      Changed          * HYDROcodone-acetaminophen  MG per tablet   Commonly known as:  NORCO   Take 1 tablet by mouth Every 6 (Six) Hours As Needed for moderate pain   (4-6).   What changed:  You were already taking a medication with the same name,   and this prescription was added. Make sure you understand how and when to   take each.       * HYDROcodone-acetaminophen 5-325 MG per tablet   Commonly known as:  NORCO   What changed:  Another medication with the same name was added. Make sure   you understand how and when to take each.       * Notice:  This list has 2 medication(s) that are the same as other   medications prescribed for you. Read the directions carefully, and ask   your doctor or other care provider to review them with you.      Stop          escitalopram 20 MG tablet   Commonly known as:  LEXAPRO       ibuprofen 800 MG tablet   Commonly known as:  ARIAN IRWIN               Latest Documented Vital Signs:  As of 1:06 AM  BP- 127/100 HR- 69 Temp- 97.7 °F (36.5 °C) O2 sat- 98%    --  Documentation assistance provided by mainor Goff for Hiro Abad.  Information recorded by the amadaibe was done at my direction and has been verified and validated by me.       Akash Goff  02/11/17 0042       KOSTA Whitman  02/11/17 0106

## 2017-02-11 NOTE — DISCHARGE INSTRUCTIONS
Home, rest, pain medicine as needed, follow up at 08:30 in the morning for placement of a blood patch.

## 2017-02-11 NOTE — ED NOTES
Patient states she had a mylogram yesterday, and today has a headache, with nausea. Patient was told by radiology to come to the ED to get a blood patch.      Chelsea Morgan RN  02/10/17 0054

## 2017-02-11 NOTE — ED NOTES
Pt states if she does not get her blood patch tonight then she wants to go home.  Charli notified.     Cristhian Christopher RN  02/11/17 0101

## 2017-02-11 NOTE — ED NOTES
"Pt reports \"I'm in pain.  My pain level went from a ten when I got here to a fifteen.  And that's terrible for someone who's in chronic pain\".      Pt lying flat on bed, VSS.     Cecilia Cottrell RN  02/10/17 2127    "

## 2017-02-11 NOTE — ANESTHESIA PROCEDURE NOTES
Blood Patch    Patient location during procedure: holding area  Blood patch placed: 2/11/2017 9:42 AMStop Time:2/11/2017 9:52 AM    Indications for Blood Patch: headache  Staff  Anesthesiologist: ALAYNA ZACARIAS  Preanesthetic Checklist  Completed: patient identified, surgical consent, pre-op evaluation, timeout performed, IV checked, risks and benefits discussed and monitors and equipment checked  Blood Patch Prep  Patient position: sitting  Sterile Tech: gloves, mask, cap and sterile barrier.  Prep: ChloraPrep  Patient monitoring: blood pressure monitoring, continuous pulse ox and EKG  Location: L3-L4  Blood patch soucrce: left antecubital IV.  Blood Patch Procedure  Sedation:yes    Approach: midline   Guidance: palpation technique  Injection method: Touhy needle  Solution used: autologous blood cm  Blood Patch Source:venous    Amount injected: 25 mL  Assessment  Patient tolerance:patient tolerated the procedure well with no apparent complications  Complications:no

## 2017-06-27 ENCOUNTER — OFFICE VISIT (OUTPATIENT)
Dept: ENDOCRINOLOGY | Age: 42
End: 2017-06-27

## 2017-06-27 VITALS
HEART RATE: 87 BPM | DIASTOLIC BLOOD PRESSURE: 78 MMHG | WEIGHT: 105 LBS | OXYGEN SATURATION: 98 % | HEIGHT: 62 IN | SYSTOLIC BLOOD PRESSURE: 110 MMHG | BODY MASS INDEX: 19.32 KG/M2

## 2017-06-27 DIAGNOSIS — R53.82 CHRONIC FATIGUE: ICD-10-CM

## 2017-06-27 DIAGNOSIS — E27.1 ADDISON DISEASE (HCC): Primary | ICD-10-CM

## 2017-06-27 PROCEDURE — 99245 OFF/OP CONSLTJ NEW/EST HI 55: CPT | Performed by: INTERNAL MEDICINE

## 2017-06-27 RX ORDER — HYDROCORTISONE 10 MG/1
TABLET ORAL
Qty: 60 TABLET | Refills: 11 | Status: SHIPPED | OUTPATIENT
Start: 2017-06-27

## 2017-06-27 RX ORDER — HYDROCODONE BITARTRATE AND ACETAMINOPHEN 7.5; 325 MG/1; MG/1
TABLET ORAL 3 TIMES DAILY
COMMUNITY
Start: 2017-06-25

## 2017-06-27 RX ORDER — ESCITALOPRAM OXALATE 20 MG/1
20 TABLET ORAL
COMMUNITY
Start: 2016-08-26

## 2017-06-27 NOTE — PROGRESS NOTES
"Chief Complaint   Patient presents with   • Adrenal Problem     Henderson's Disease   New Patient Appointment/Henderson's Disease    Nathalie Weber 42 y.o. presents as a new patient for the evaluation of Adrenal insufficiency. Consulted by Dr. Witt.   Pt has been in constant pain in her neck and back and so has been placed on pain medication since about Jan 2017. Pt is trying to wean herself off of the pain medications. Currently on hydrocodone 3 tablets a day.  Pt reports that she had steroid injections in her back about 4 - 5 months ago.  She had them almost every 2 weeks in the beginning.  Since pt was tired continuously at the pain management they checked her entire hormonal profile which showed low cortisol levels.   She saw  but for some reason pt was dropped by Dr. KEENAN.     Today in the clinic patient is extremely emotional and has been stubborn towards anything that I said.  She also reported that she did not like the way I am answering her questions.    Pt was placed 20 mg in the morning and 10 mg in the evening by Dr. Turner which improved her energy levels but she feels weird like \" cami \" .  Pt reports feeling rocking and so she was instructed by  to change her dose to 10 mg in the morning, 10 mg in the afternoon and 10 mg in the evening.  This according to the documentation by Dr. KEENAN sounded like patient wanted to be on the current dosage.    Patient also reports of weight loss in the last few years, occasional episodes of dizziness, blood pressure always running low, but never lost consciousness.  No proximal muscle weakness but physical activity Limited due to her back pain and neck pain.  No complaints of hypoglycemia, not on K supplementation, no issues with hyperpigmentation.  Lab work up from her prior endocrinologist showed  ACTH -Less than 5  Failed ACTH stim test - 0.36 --> 1.6 --> 2.73  A.m. cortisol level-2.56  Prolactin-8.9  Free T4 0.97  Insulin-like growth factor " 1-217    MRI of the pituitary  Did not show pituitary adenoma.    I have reviewed the patient's allergies, medicines, past medical hx, family hx and social hx in detail.    Past Medical History:   Diagnosis Date   • Anxiety    • Depression    • Hypertension        Family History   Problem Relation Age of Onset   • Cancer Mother    • Hypertension Father        Social History     Social History   • Marital status: Unknown     Spouse name: N/A   • Number of children: N/A   • Years of education: N/A     Occupational History   • Not on file.     Social History Main Topics   • Smoking status: Former Smoker     Types: Cigarettes     Quit date: 3/1/2016   • Smokeless tobacco: Not on file   • Alcohol use No   • Drug use: Not on file   • Sexual activity: Not on file     Other Topics Concern   • Not on file     Social History Narrative       Allergies   Allergen Reactions   • Lidocaine Hives     Reports as Lidocaine cream   Also caused severe HTN  Reports as Lidocaine cream   Also caused severe HTN   • Novocain [Procaine] Other (See Comments)     Other reaction(s): Other (See Comments)  Severe HTN  Severe HTN   • Oxycodone-Acetaminophen Itching   • Percocet [Oxycodone-Acetaminophen] Itching         Current Outpatient Prescriptions:   •  escitalopram (LEXAPRO) 20 MG tablet, Take 20 mg by mouth., Disp: , Rfl:   •  gabapentin (NEURONTIN) 300 MG capsule, Take 300 mg by mouth 3 (Three) Times a Day., Disp: , Rfl:   •  HYDROcodone-acetaminophen (NORCO) 7.5-325 MG per tablet, 3 (Three) Times a Day., Disp: , Rfl:   •  levonorgestrel (MIRENA, 52 MG,) 20 MCG/24HR IUD, 1 each by Intrauterine route., Disp: , Rfl:   •  lisinopril (PRINIVIL,ZESTRIL) 5 MG tablet, 5 mg Daily., Disp: , Rfl:   •  Suvorexant (BELSOMRA) 20 MG tablet, Take  by mouth Every Night., Disp: , Rfl:   •  topiramate (TOPAMAX) 50 MG tablet, Take 50 mg by mouth 2 (Two) Times a Day., Disp: , Rfl:   •  hydrocortisone (CORTEF) 10 MG tablet, 10 mg in the morning and 5 mg in the  "evening with meals, Disp: 60 tablet, Rfl: 11    Review of Systems   Constitutional: Negative for fever.   HENT: Negative for facial swelling, nosebleeds, trouble swallowing and voice change.    Eyes: Positive for redness. Negative for pain.   Respiratory: Negative for shortness of breath and wheezing.    Cardiovascular: Negative for palpitations and leg swelling.   Gastrointestinal: Positive for constipation and diarrhea. Negative for abdominal pain and vomiting.   Endocrine: Positive for polydipsia. Negative for polyuria.   Genitourinary: Negative for decreased urine volume and frequency.   Musculoskeletal: Positive for neck pain. Negative for joint swelling.   Skin: Negative for rash.   Allergic/Immunologic: Negative for immunocompromised state.   Neurological: Negative for seizures and facial asymmetry.   Hematological: Bruises/bleeds easily.   Psychiatric/Behavioral: Negative for agitation and confusion.       Objective:    /78  Pulse 87  Ht 62\" (157.5 cm)  Wt 105 lb (47.6 kg)  SpO2 98%  BMI 19.2 kg/m2    Physical Exam   Constitutional: She is oriented to person, place, and time. She appears well-nourished. No distress.   Normal built   HENT:   Head: Normocephalic and atraumatic.   Eyes: Conjunctivae and EOM are normal. No scleral icterus.   All 4 visual fields intact   Neck: Normal range of motion. Neck supple. No thyromegaly present.   Cardiovascular: Normal rate and normal heart sounds.  Exam reveals no friction rub.    No murmur heard.  HR - 87   Pulmonary/Chest: Effort normal and breath sounds normal. No stridor. She has no wheezes. She has no rales.   Abdominal: Soft. Bowel sounds are normal. She exhibits no distension. There is no tenderness.   Musculoskeletal: She exhibits no edema or tenderness.   Lymphadenopathy:     She has no cervical adenopathy.   Neurological: She is alert and oriented to person, place, and time.   Skin: Skin is warm and dry. She is not diaphoretic.   No skin " discoloration   Psychiatric:   Emotionally upset and very demanding   Vitals reviewed.      Results Review:    I reviewed the patient's new clinical results.    Admission on 02/10/2017, Discharged on 02/11/2017   Component Date Value Ref Range Status   • Extra Tube 02/10/2017 hold for add-on   Final    Auto resulted   • Extra Tube 02/10/2017 Hold for add-ons.   Final    Auto resulted.   • Extra Tube 02/10/2017 hold for add-on   Final    Auto resulted   • Extra Tube 02/10/2017 Hold for add-ons.   Final    Auto resulted.   • Glucose 02/10/2017 100* 65 - 99 mg/dL Final   • BUN 02/10/2017 10  6 - 20 mg/dL Final   • Creatinine 02/10/2017 0.89  0.57 - 1.00 mg/dL Final   • Sodium 02/10/2017 144  136 - 145 mmol/L Final   • Potassium 02/10/2017 3.9  3.5 - 5.2 mmol/L Final   • Chloride 02/10/2017 109* 98 - 107 mmol/L Final   • CO2 02/10/2017 21.4* 22.0 - 29.0 mmol/L Final   • Calcium 02/10/2017 9.3  8.6 - 10.5 mg/dL Final   • eGFR Non African Amer 02/10/2017 70  >60 mL/min/1.73 Final   • BUN/Creatinine Ratio 02/10/2017 11.2  7.0 - 25.0 Final   • Anion Gap 02/10/2017 13.6  mmol/L Final   • Protime 02/10/2017 13.4  11.7 - 14.2 Seconds Final   • INR 02/10/2017 1.06  0.90 - 1.10 Final   • PTT 02/10/2017 31.7  22.7 - 35.4 seconds Final   • WBC 02/10/2017 10.07  4.50 - 10.70 10*3/mm3 Final   • RBC 02/10/2017 3.72* 3.90 - 5.20 10*6/mm3 Final   • Hemoglobin 02/10/2017 13.9  11.9 - 15.5 g/dL Final   • Hematocrit 02/10/2017 40.2  35.6 - 45.5 % Final   • MCV 02/10/2017 108.1* 80.5 - 98.2 fL Final   • MCH 02/10/2017 37.4* 26.9 - 32.0 pg Final   • MCHC 02/10/2017 34.6  32.4 - 36.3 g/dL Final   • RDW 02/10/2017 12.9  11.7 - 13.0 % Final   • RDW-SD 02/10/2017 50.6  37.0 - 54.0 fl Final   • MPV 02/10/2017 11.1  6.0 - 12.0 fL Final   • Platelets 02/10/2017 242  140 - 500 10*3/mm3 Final   • Neutrophil % 02/10/2017 42.7  42.7 - 76.0 % Final   • Lymphocyte % 02/10/2017 36.6  19.6 - 45.3 % Final   • Monocyte % 02/10/2017 7.3  5.0 - 12.0 % Final    • Eosinophil % 02/10/2017 12.0* 0.3 - 6.2 % Final   • Basophil % 02/10/2017 1.2  0.0 - 1.5 % Final   • Immature Grans % 02/10/2017 0.2  0.0 - 0.5 % Final   • Neutrophils, Absolute 02/10/2017 4.29  1.90 - 8.10 10*3/mm3 Final   • Lymphocytes, Absolute 02/10/2017 3.69  0.90 - 4.80 10*3/mm3 Final   • Monocytes, Absolute 02/10/2017 0.74  0.20 - 1.20 10*3/mm3 Final   • Eosinophils, Absolute 02/10/2017 1.21* 0.00 - 0.70 10*3/mm3 Final   • Basophils, Absolute 02/10/2017 0.12  0.00 - 0.20 10*3/mm3 Final   • Immature Grans, Absolute 02/10/2017 0.02  0.00 - 0.03 10*3/mm3 Final   • Macrocytes 02/10/2017 Large/3+  None Seen Final   • WBC Morphology 02/10/2017 Normal  Normal Final   • Platelet Morphology 02/10/2017 Normal  Normal Final       Nathalie was seen today for adrenal problem.    Diagnoses and all orders for this visit:    [place] disease  -     Aldosterone  -     Aldosterone / Renin Ratio  -     Aldosterone, Urine, 24 Hour  -     Antiadrenal Antibodies, Quant  -     Renin Direct Assay  -     Prolactin  -     T4, Free  -     TSH    Chronic fatigue  -     Aldosterone  -     Aldosterone / Renin Ratio  -     Aldosterone, Urine, 24 Hour  -     Antiadrenal Antibodies, Quant  -     Renin Direct Assay  -     Prolactin  -     T4, Free  -     TSH    Other orders  -     hydrocortisone (CORTEF) 10 MG tablet; 10 mg in the morning and 5 mg in the evening with meals    Secondary adrenal insufficiency  Patient did her google research and reports that she wanted to be on the set amount of hydrocortisone as that is what is making her feel energetic.  She became very emotional when I told her that I'm not entirely sure if she truly has adrenal insufficiency.  She didn't want to listen to the entire plan and her father had to help me in the process.  I feel that the fact that patient is on the pain medication and also the fact that she received steroids shots to her back has resulted in secondary adrenal insufficiency with low ACTH but  it not clear as to why patient did not respond to the ACTH stim test.     For now due to failed ACTH stim test and low cortisol levels i advised  the patient to continue hydrocortisone 10 mg in the morning and 5 mg in the evening with meals which is the physiological dose for hydrocortisone replacement in any patient with adrenal insufficiency.  I also gave the handout to the patient explaining the sick day rules on when to double up on the dosage of the hydrocortisone.  For now as it is not clear about diagnosis would recommend the patient to go to the emergency department if she develops a low blood pressure or nausea or vomiting for IV steroid replacement.    We have yet to establish if patient has [place]'s disease or secondary adrenal insufficiency. I educated the patient that she needs to stop the evening dose of hydrocortisone prior to her next blood work up and will check the a.m. cortisol level at that point.  Will check aldosterone, renin levels to see if this is related to primary adrenal issue.  Based on these results would consider CT scan of the adrenal's.    Will check prolactin level, TSH, free T4.    Thank you for asking me to see your patient, Nathalie Weber in consultation.    45 minutes out of 80 minutes face to face spent in counseling the patient extensively on the pathogenesis of the disease, prognosis and treatment plan.      The patient is not very happy with the treatment plan and at that point I recommended the patient that she can seek a second opinion or can get transferred to any other endocrinologist in the practice.    Andra Gutierrez MD  06/27/17

## 2017-06-27 NOTE — PATIENT INSTRUCTIONS
In response to medical stress, healthy adrenals dramatically increase the amount of cortisol they produce. This will not occur in patients with adrenal insufficiency unless they increase the dose of their glucocorticoid medication.   If you are vomiting and can’t keep your medications down due to vomiting, go to the Emergency Room for IV glucocorticoids and fluids.   1. When you have a medical illness make sure your drink plenty of salt and sugar-containing fluids in order to keep yourself well hydrated.   2. If your are sick enough to miss work - double your dose of your glucocorticoid medication (hydrocortisone, cortisone acetate, prednisone) for 1-3 days.   3. If your are sick enough to miss work and have a fever - triple your dose of your glucocorticoid medication (hydrocortisone, cortisone acetate, prednisone) for 1-3 days.     Please hold her hydrocortisone the night before the blood work up and come for the blood work up.

## 2017-06-30 ENCOUNTER — APPOINTMENT (OUTPATIENT)
Dept: WOMENS IMAGING | Facility: HOSPITAL | Age: 42
End: 2017-06-30

## 2017-06-30 PROCEDURE — 77063 BREAST TOMOSYNTHESIS BI: CPT | Performed by: RADIOLOGY

## 2017-06-30 PROCEDURE — G0202 SCR MAMMO BI INCL CAD: HCPCS | Performed by: RADIOLOGY

## 2017-06-30 PROCEDURE — 77067 SCR MAMMO BI INCL CAD: CPT | Performed by: RADIOLOGY

## 2017-07-06 LAB
ADRENAL AB TITR SER IF: NEGATIVE {TITER}
ALDOST SERPL-MCNC: 1.5 NG/DL (ref 0–30)
ALDOST/RENIN PLAS-RTO: 4.2 {RATIO} (ref 0–30)
PROLACTIN SERPL-MCNC: 12.2 NG/ML (ref 4.8–23.3)
RENIN PLAS-CCNC: 0.36 NG/ML/HR (ref 0.17–5.38)
T4 FREE SERPL-MCNC: 1.07 NG/DL (ref 0.93–1.7)
TSH SERPL DL<=0.005 MIU/L-ACNC: 2.01 MIU/ML (ref 0.27–4.2)

## 2017-07-25 ENCOUNTER — RESULTS ENCOUNTER (OUTPATIENT)
Dept: ENDOCRINOLOGY | Age: 42
End: 2017-07-25

## 2017-07-25 DIAGNOSIS — E27.1 ADDISON DISEASE (HCC): ICD-10-CM

## 2017-07-25 DIAGNOSIS — R53.82 CHRONIC FATIGUE: ICD-10-CM

## 2017-08-04 LAB
ACTH PLAS-MCNC: 38.5 PG/ML (ref 7.2–63.3)
ALDOST 24H UR-MRATE: <9.25 UG/24 HR (ref 0–19)
ALDOST UR-MCNC: <2.5 UG/L
CORTIS AM PEAK SERPL-MCNC: 9.3 UG/DL (ref 6.2–19.4)

## 2017-08-08 ENCOUNTER — OFFICE VISIT (OUTPATIENT)
Dept: ENDOCRINOLOGY | Age: 42
End: 2017-08-08

## 2017-08-08 VITALS
SYSTOLIC BLOOD PRESSURE: 106 MMHG | WEIGHT: 104 LBS | HEIGHT: 62 IN | HEART RATE: 86 BPM | BODY MASS INDEX: 19.14 KG/M2 | DIASTOLIC BLOOD PRESSURE: 70 MMHG | OXYGEN SATURATION: 93 %

## 2017-08-08 DIAGNOSIS — R79.89 ABNORMAL CORTISOL LEVEL: Primary | ICD-10-CM

## 2017-08-08 PROCEDURE — 99214 OFFICE O/P EST MOD 30 MIN: CPT | Performed by: INTERNAL MEDICINE

## 2017-08-08 NOTE — PROGRESS NOTES
42 y.o.    Patient Care Team:  Henry Witt MD as PCP - General (Family Medicine)    Chief Complaint:    Follow Up / [place] disease  Subjective     HPI    Nathalie Weber 42 y.o. presents as a follow up patient for the evaluation of Adrenal insufficiency. Consulted by Dr. Witt.     Pt reports that she is still taking her pain medication due to her chronic neck pain. She takes approx 3 tabs of hydrocodone per day. Last steroid injection to her neck was about 4 - 5 months.  Since patient has been constantly tired at the pain management Center her hormonal profile has been checked and that showed low cortisol levels.   She saw  but for some reason pt was dropped by Dr. RAJAN     Since patient's last visit I advised the patient to decrease her hydrocortisone dose to 10 mg in the morning and 5 mg in the evening.  Patient did not take her hydrocortisone 5 mg in the evening on the day prior to her blood work up and her random cortisol level was 9.3 mcg/dL.  ACTH-38.9, adrenal antibodies negative, aldosterone and renin levels-within normal limits, prolactin level-12.2, TSH, free T4 within normal limits.  Today in the clinic I discussed with the patient that based on the blood work up she does not have primary or secondary adrenal insufficiency.  Immediately after this statement patient became very upset and started crying concerned about why she lost all this weight.  Patient's baseline weight is around 104 pounds and she lost about 10 pounds before being started on steroids.  She does have occasional episodes of dizziness, blood pressure according to the patient remains low but here in the clinic her blood pressure has been within normal limits, no loss of consciousness no proximal muscle weakness but physical activity is limited due to neck pain.  Not on potassium supplementation, no issues with hyperpigmentation, no concerns with hypoglycemia.    MRI of the pituitary done by her prior  endocrinologist  No pituitary adenoma.  Next    Menstrual history patient currently has an IUD     Interval History      The following portions of the patient's history were reviewed and updated as appropriate: allergies, current medications, past family history, past medical history, past social history, past surgical history and problem list.    Past Medical History:   Diagnosis Date   • Anxiety    • Depression    • Hypertension      Family History   Problem Relation Age of Onset   • Cancer Mother    • Hypertension Father      Social History     Social History   • Marital status: Unknown     Spouse name: N/A   • Number of children: N/A   • Years of education: N/A     Occupational History   • Not on file.     Social History Main Topics   • Smoking status: Former Smoker     Types: Cigarettes     Quit date: 3/1/2016   • Smokeless tobacco: Not on file   • Alcohol use No   • Drug use: Not on file   • Sexual activity: Not on file     Other Topics Concern   • Not on file     Social History Narrative     Allergies   Allergen Reactions   • Lidocaine Hives     Reports as Lidocaine cream   Also caused severe HTN  Reports as Lidocaine cream   Also caused severe HTN   • Novocain [Procaine] Other (See Comments)     Other reaction(s): Other (See Comments)  Severe HTN  Severe HTN   • Oxycodone-Acetaminophen Itching   • Percocet [Oxycodone-Acetaminophen] Itching       Current Outpatient Prescriptions:   •  escitalopram (LEXAPRO) 20 MG tablet, Take 20 mg by mouth., Disp: , Rfl:   •  gabapentin (NEURONTIN) 300 MG capsule, Take 300 mg by mouth 3 (Three) Times a Day., Disp: , Rfl:   •  HYDROcodone-acetaminophen (NORCO) 7.5-325 MG per tablet, 3 (Three) Times a Day., Disp: , Rfl:   •  hydrocortisone (CORTEF) 10 MG tablet, 10 mg in the morning and 5 mg in the evening with meals, Disp: 60 tablet, Rfl: 11  •  levonorgestrel (MIRENA, 52 MG,) 20 MCG/24HR IUD, 1 each by Intrauterine route., Disp: , Rfl:   •  lisinopril (PRINIVIL,ZESTRIL) 5 MG  "tablet, 5 mg Daily., Disp: , Rfl:   •  Suvorexant (BELSOMRA) 20 MG tablet, Take  by mouth Every Night., Disp: , Rfl:   •  topiramate (TOPAMAX) 50 MG tablet, Take 50 mg by mouth 2 (Two) Times a Day., Disp: , Rfl:         Review of Systems   Constitutional: Negative for fever.   HENT: Negative for facial swelling, nosebleeds, trouble swallowing and voice change.    Eyes: Negative for pain and redness.   Respiratory: Negative for shortness of breath and wheezing.    Cardiovascular: Negative for palpitations and leg swelling.   Gastrointestinal: Negative for abdominal pain, diarrhea and vomiting.   Endocrine: Positive for polydipsia. Negative for polyuria.   Genitourinary: Negative for decreased urine volume and frequency.   Musculoskeletal: Positive for neck pain. Negative for joint swelling.   Skin: Negative for rash.   Allergic/Immunologic: Negative for immunocompromised state.   Neurological: Negative for seizures and facial asymmetry.   Hematological: Bruises/bleeds easily.   Psychiatric/Behavioral: Negative for agitation and confusion.       Objective       Vitals:    08/08/17 1502   BP: 106/70   Pulse: 86   SpO2: 93%   Weight: 104 lb (47.2 kg)   Height: 62\" (157.5 cm)     Body mass index is 19.02 kg/(m^2).      Physical Exam   Constitutional: She is oriented to person, place, and time. She appears well-nourished.   Thin female   HENT:   Head: Normocephalic and atraumatic.   Eyes: Conjunctivae and EOM are normal. No scleral icterus.   Neck: Normal range of motion. Neck supple. No thyromegaly present.   Cardiovascular: Normal rate and normal heart sounds.  Exam reveals no friction rub.    No murmur heard.  HR - 86   Pulmonary/Chest: Effort normal and breath sounds normal. No stridor. She has no wheezes. She has no rales.   Abdominal: Soft. Bowel sounds are normal. She exhibits no distension. There is no tenderness.   Musculoskeletal: She exhibits no edema or tenderness.   Lymphadenopathy:     She has no cervical " adenopathy.   Neurological: She is alert and oriented to person, place, and time. She has normal reflexes.   Skin: Skin is warm and dry. She is not diaphoretic.   Some skin tan   Psychiatric: She has a normal mood and affect.   Vitals reviewed.    Results Review:     I reviewed the patient's new clinical results.    Medical records reviewed  Summary: done      Results Encounter on 07/25/2017   Component Date Value Ref Range Status   • Cortisol - AM 07/25/2017 9.3  6.2 - 19.4 ug/dL Final   • ACTH 07/25/2017 38.5  7.2 - 63.3 pg/mL Final    ACTH reference interval for samples collected between 7 and 10 AM.   • Aldosterone Urine 07/25/2017 <2.50  Not Estab. ug/L Final   • Aldosterone, 24H Ur 07/25/2017 <9.25  0.00 - 19.00 ug/24 hr Final    Comment:                                  Adult Ranges                      Low Sodium Intake     20.00 - 80.00                      Normal Sodium Intake   0.00 - 19.00                      High Sodium Intake     0.00 - 12.00  This test was developed and its performance characteristics  determined by [facility]. It has not been cleared or approved  by the Food and Drug Administration.       No results found for: HGBA1C  Lab Results   Component Value Date    CREATININE 0.89 02/10/2017     Imaging Results (most recent)     None                Assessment and Plan:    Nathalie was seen today for pituitary problem.    Diagnoses and all orders for this visit:    Abnormal cortisol level  -     DHEA-Sulfate    Discussed with the patient that based on her work up she does not have primary or secondary adrenal insufficiency.  Patient raised the concern of her DHEAS levels being low.  Will check her levels today.  Since patient has been very upset with the recommendations I discussed with the patient about seeking a second opinion.  However patient walked out of the clinic without making an appointment either for second opinion or with me.  She also did not get her blood work up done today.    I  extensively discussed with the patient and her  about the pathogenesis of the primary and secondary adrenal insufficiency.  Printed out the lab work up for the patient and went over each lab in detail and explained to them as to why they don't have the adrenal insufficiency.      Recommendations  Patient needs to start the steroid taper.  She can hold off on the taper until she sees another endocrinologist for second opinion.  Will refer the patient to Dr. Estrada.  The recommendations might change after patient seeks the second opinion.  However as of now I intend to taper her steroids very slowly.  Patient will be on 10 and 5 mg of hydrocortisone for 3 weeks, 10 mg of hydrocortisone for 3 weeks, 5 mg of hydrocortisone for another 3 weeks and then stop.    13 minutes out of 25 minutes face to face spent in counseling the patient extensively on treatment plan and pathogenesis of adrenal insufficiency.

## 2017-08-10 ENCOUNTER — TELEPHONE (OUTPATIENT)
Dept: ENDOCRINOLOGY | Age: 42
End: 2017-08-10

## 2017-08-10 NOTE — TELEPHONE ENCOUNTER
Patient called wanting lab results for a 24 hour urine. She has all the other lab results Patient stated she has found a new Endocrinologist .     Pre Dr Gutierrez we only did the Cortisol Am test the 24 was not needed at the time.    Spoke with patient  Patient was not happy that the test was not done and was questioning  why it wasn't done  Patient hung up